# Patient Record
Sex: MALE | URBAN - METROPOLITAN AREA
[De-identification: names, ages, dates, MRNs, and addresses within clinical notes are randomized per-mention and may not be internally consistent; named-entity substitution may affect disease eponyms.]

---

## 2020-04-23 ENCOUNTER — TELEPHONE (OUTPATIENT)
Dept: ORTHOPEDICS | Facility: CLINIC | Age: 35
End: 2020-04-23

## 2020-04-23 NOTE — TELEPHONE ENCOUNTER
Unable to call pt. I e-mailed Ron daog to mail the USB of records and then making him an apt. I provided out address and CC'D 's ATC, [place].
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
## 2020-04-28 ENCOUNTER — PRE VISIT (OUTPATIENT)
Dept: ORTHOPEDICS | Facility: CLINIC | Age: 35
End: 2020-04-28

## 2020-06-02 NOTE — TELEPHONE ENCOUNTER
Action 06/02/20 8:01 AM - Taylor   Action Taken  [place] from [place] [facility] [place] [facility].  Per packaging disc contains one MRI scan